# Patient Record
Sex: MALE | Race: WHITE | NOT HISPANIC OR LATINO
[De-identification: names, ages, dates, MRNs, and addresses within clinical notes are randomized per-mention and may not be internally consistent; named-entity substitution may affect disease eponyms.]

---

## 2017-02-08 ENCOUNTER — LAB SERVICES (OUTPATIENT)
Dept: OTHER | Age: 52
End: 2017-02-08

## 2017-02-08 ENCOUNTER — CHARTING TRANS (OUTPATIENT)
Dept: OTHER | Age: 52
End: 2017-02-08

## 2017-02-08 LAB
CURRENT DOSE: NORMAL
INR PPP: 2.1
RECOMMENDED DOSE: NORMAL

## 2017-09-26 ENCOUNTER — CHARTING TRANS (OUTPATIENT)
Dept: OTHER | Age: 52
End: 2017-09-26

## 2017-11-10 ENCOUNTER — MYAURORA ACCOUNT LINK (OUTPATIENT)
Dept: OTHER | Age: 52
End: 2017-11-10

## 2017-11-10 ENCOUNTER — CHARTING TRANS (OUTPATIENT)
Dept: OTHER | Age: 52
End: 2017-11-10

## 2018-01-30 PROBLEM — Z12.11 COLON CANCER SCREENING: Status: ACTIVE | Noted: 2018-01-30

## 2018-01-30 PROBLEM — F17.200 TOBACCO DEPENDENCE: Status: ACTIVE | Noted: 2018-01-30

## 2018-01-30 PROBLEM — F41.9 ANXIETY: Status: ACTIVE | Noted: 2018-01-30

## 2018-01-30 PROBLEM — M1A.00X0 IDIOPATHIC CHRONIC GOUT WITHOUT TOPHUS, UNSPECIFIED SITE: Status: ACTIVE | Noted: 2018-01-30

## 2018-01-30 PROBLEM — I89.0 LYMPHEDEMA OF BOTH LOWER EXTREMITIES: Status: ACTIVE | Noted: 2018-01-30

## 2018-07-25 PROBLEM — Z12.5 PROSTATE CANCER SCREENING: Status: ACTIVE | Noted: 2018-07-25

## 2018-07-25 PROBLEM — Z79.01 LONG TERM CURRENT USE OF ANTICOAGULANT: Status: ACTIVE | Noted: 2018-07-25

## 2018-11-02 VITALS
TEMPERATURE: 98.6 F | BODY MASS INDEX: 39.17 KG/M2 | RESPIRATION RATE: 18 BRPM | WEIGHT: 315 LBS | DIASTOLIC BLOOD PRESSURE: 100 MMHG | SYSTOLIC BLOOD PRESSURE: 150 MMHG | HEIGHT: 75 IN | HEART RATE: 86 BPM

## 2018-11-02 VITALS
TEMPERATURE: 98 F | HEART RATE: 62 BPM | SYSTOLIC BLOOD PRESSURE: 118 MMHG | HEIGHT: 75 IN | WEIGHT: 315 LBS | BODY MASS INDEX: 39.17 KG/M2 | DIASTOLIC BLOOD PRESSURE: 69 MMHG

## 2019-03-15 RX ORDER — METOPROLOL TARTRATE 50 MG/1
TABLET, FILM COATED ORAL
Qty: 180 TABLET | Refills: 0 | OUTPATIENT
Start: 2019-03-15

## 2019-05-21 PROBLEM — L98.9 SKIN DEFECT: Status: ACTIVE | Noted: 2019-05-21

## 2020-10-22 PROBLEM — M1A.00X0 IDIOPATHIC CHRONIC GOUT WITHOUT TOPHUS, UNSPECIFIED SITE: Status: RESOLVED | Noted: 2018-01-30 | Resolved: 2020-10-22

## 2020-10-22 PROBLEM — Z12.5 PROSTATE CANCER SCREENING: Status: RESOLVED | Noted: 2018-07-25 | Resolved: 2020-10-22

## 2020-10-22 PROBLEM — Z87.891 HISTORY OF TOBACCO USE: Status: ACTIVE | Noted: 2018-01-30

## 2020-10-22 PROBLEM — Z12.11 COLON CANCER SCREENING: Status: RESOLVED | Noted: 2018-01-30 | Resolved: 2020-10-22

## 2020-10-22 PROBLEM — L98.9 SKIN DEFECT: Status: RESOLVED | Noted: 2019-05-21 | Resolved: 2020-10-22

## 2021-02-17 ENCOUNTER — HOSPITAL ENCOUNTER (INPATIENT)
Facility: HOSPITAL | Age: 56
LOS: 1 days | Discharge: HOME | DRG: 607 | End: 2021-02-18
Attending: HOSPITALIST | Admitting: HOSPITALIST
Payer: COMMERCIAL

## 2021-02-17 PROBLEM — L03.90 CELLULITIS: Status: ACTIVE | Noted: 2021-02-17

## 2021-02-17 LAB
ANION GAP SERPL CALC-SCNC: 3 MMOL/L (ref 0–18)
BUN BLD-MCNC: 9 MG/DL (ref 7–18)
BUN/CREAT SERPL: 7.8 (ref 10–20)
CALCIUM BLD-MCNC: 8.7 MG/DL (ref 8.5–10.1)
CHLORIDE SERPL-SCNC: 107 MMOL/L (ref 98–112)
CO2 SERPL-SCNC: 32 MMOL/L (ref 21–32)
CREAT BLD-MCNC: 1.16 MG/DL
GLUCOSE BLD-MCNC: 106 MG/DL (ref 70–99)
OSMOLALITY SERPL CALC.SUM OF ELEC: 293 MOSM/KG (ref 275–295)
POTASSIUM SERPL-SCNC: 3.8 MMOL/L (ref 3.5–5.1)
SARS-COV-2 RNA RESP QL NAA+PROBE: NOT DETECTED
SODIUM SERPL-SCNC: 142 MMOL/L (ref 136–145)

## 2021-02-17 PROCEDURE — 80048 BASIC METABOLIC PNL TOTAL CA: CPT | Performed by: HOSPITALIST

## 2021-02-17 RX ORDER — ALPRAZOLAM 0.5 MG/1
0.5 TABLET ORAL 2 TIMES DAILY PRN
Status: DISCONTINUED | OUTPATIENT
Start: 2021-02-17 | End: 2021-02-19

## 2021-02-17 RX ORDER — LOSARTAN POTASSIUM 100 MG/1
100 TABLET ORAL NIGHTLY
Status: DISCONTINUED | OUTPATIENT
Start: 2021-02-18 | End: 2021-02-17

## 2021-02-17 RX ORDER — METOPROLOL TARTRATE 50 MG/1
50 TABLET, FILM COATED ORAL
Status: DISCONTINUED | OUTPATIENT
Start: 2021-02-18 | End: 2021-02-17

## 2021-02-17 RX ORDER — METOPROLOL TARTRATE 50 MG/1
50 TABLET, FILM COATED ORAL
Status: DISCONTINUED | OUTPATIENT
Start: 2021-02-17 | End: 2021-02-19

## 2021-02-17 RX ORDER — LOSARTAN POTASSIUM 100 MG/1
100 TABLET ORAL NIGHTLY
Status: DISCONTINUED | OUTPATIENT
Start: 2021-02-17 | End: 2021-02-19

## 2021-02-17 RX ORDER — DILTIAZEM HYDROCHLORIDE 120 MG/1
240 CAPSULE, EXTENDED RELEASE ORAL DAILY
Status: DISCONTINUED | OUTPATIENT
Start: 2021-02-18 | End: 2021-02-19

## 2021-02-17 RX ORDER — ACETAMINOPHEN 325 MG/1
650 TABLET ORAL EVERY 6 HOURS PRN
Status: DISCONTINUED | OUTPATIENT
Start: 2021-02-17 | End: 2021-02-19

## 2021-02-17 RX ORDER — HEPARIN SODIUM 5000 [USP'U]/ML
7500 INJECTION, SOLUTION INTRAVENOUS; SUBCUTANEOUS EVERY 8 HOURS SCHEDULED
Status: DISCONTINUED | OUTPATIENT
Start: 2021-02-17 | End: 2021-02-17

## 2021-02-18 VITALS
OXYGEN SATURATION: 93 % | DIASTOLIC BLOOD PRESSURE: 87 MMHG | TEMPERATURE: 98 F | WEIGHT: 315 LBS | HEART RATE: 90 BPM | SYSTOLIC BLOOD PRESSURE: 130 MMHG | RESPIRATION RATE: 18 BRPM | BODY MASS INDEX: 61 KG/M2

## 2021-02-18 LAB
BASOPHILS # BLD AUTO: 0.02 X10(3) UL (ref 0–0.2)
BASOPHILS NFR BLD AUTO: 0.3 %
DEPRECATED RDW RBC AUTO: 48.2 FL (ref 35.1–46.3)
EOSINOPHIL # BLD AUTO: 0.17 X10(3) UL (ref 0–0.7)
EOSINOPHIL NFR BLD AUTO: 2.8 %
ERYTHROCYTE [DISTWIDTH] IN BLOOD BY AUTOMATED COUNT: 13.9 % (ref 11–15)
HCT VFR BLD AUTO: 41.9 %
HGB BLD-MCNC: 14.2 G/DL
IMM GRANULOCYTES # BLD AUTO: 0.01 X10(3) UL (ref 0–1)
IMM GRANULOCYTES NFR BLD: 0.2 %
INR BLD: 1.14 (ref 0.89–1.11)
LYMPHOCYTES # BLD AUTO: 1.53 X10(3) UL (ref 1–4)
LYMPHOCYTES NFR BLD AUTO: 25 %
MCH RBC QN AUTO: 32 PG (ref 26–34)
MCHC RBC AUTO-ENTMCNC: 33.9 G/DL (ref 31–37)
MCV RBC AUTO: 94.4 FL
MONOCYTES # BLD AUTO: 0.66 X10(3) UL (ref 0.1–1)
MONOCYTES NFR BLD AUTO: 10.8 %
NEUTROPHILS # BLD AUTO: 3.72 X10 (3) UL (ref 1.5–7.7)
NEUTROPHILS # BLD AUTO: 3.72 X10(3) UL (ref 1.5–7.7)
NEUTROPHILS NFR BLD AUTO: 60.9 %
PLATELET # BLD AUTO: 181 10(3)UL (ref 150–450)
PSA SERPL DL<=0.01 NG/ML-MCNC: 15 SECONDS (ref 12.4–14.6)
RBC # BLD AUTO: 4.44 X10(6)UL
WBC # BLD AUTO: 6.1 X10(3) UL (ref 4–11)

## 2021-02-18 PROCEDURE — 85610 PROTHROMBIN TIME: CPT | Performed by: HOSPITALIST

## 2021-02-18 PROCEDURE — 99214 OFFICE O/P EST MOD 30 MIN: CPT

## 2021-02-18 PROCEDURE — 85025 COMPLETE CBC W/AUTO DIFF WBC: CPT | Performed by: HOSPITALIST

## 2021-02-18 RX ORDER — DIPHENHYDRAMINE HYDROCHLORIDE 50 MG/ML
INJECTION INTRAMUSCULAR; INTRAVENOUS
Status: COMPLETED
Start: 2021-02-18 | End: 2021-02-18

## 2021-02-18 RX ORDER — MORPHINE SULFATE 2 MG/ML
INJECTION, SOLUTION INTRAMUSCULAR; INTRAVENOUS
Status: COMPLETED
Start: 2021-02-18 | End: 2021-02-18

## 2021-02-18 RX ORDER — POTASSIUM CHLORIDE 20 MEQ/1
40 TABLET, EXTENDED RELEASE ORAL ONCE
Status: COMPLETED | OUTPATIENT
Start: 2021-02-18 | End: 2021-02-18

## 2021-02-18 RX ORDER — DIPHENHYDRAMINE HYDROCHLORIDE 50 MG/ML
25 INJECTION INTRAMUSCULAR; INTRAVENOUS ONCE
Status: COMPLETED | OUTPATIENT
Start: 2021-02-18 | End: 2021-02-18

## 2021-02-18 RX ORDER — HEPARIN SODIUM 5000 [USP'U]/ML
5000 INJECTION, SOLUTION INTRAVENOUS; SUBCUTANEOUS ONCE
Status: COMPLETED | OUTPATIENT
Start: 2021-02-18 | End: 2021-02-18

## 2021-02-18 RX ORDER — MORPHINE SULFATE 2 MG/ML
2 INJECTION, SOLUTION INTRAMUSCULAR; INTRAVENOUS ONCE
Status: COMPLETED | OUTPATIENT
Start: 2021-02-18 | End: 2021-02-18

## 2021-02-18 NOTE — H&P
[facility] Hospitalist History and Physical      CC: R LE foot wounds/pain    PCP: Tamanna Barrett MD    History of Present Illness: Patient is a 48year old male with PMH sig for obesity, JARETT, HTN, bipolar, anxiety, hx of PE, hx of LE lymphadema and prior infect Tartrate 50 MG Oral Tab, Take 1 tablet (50 mg total) by mouth 2 (two) times daily. , Disp: 180 tablet, Rfl: 1    •  dilTIAZem HCl ER Beads 240 MG Oral Capsule SR 24 Hr, Take 1 capsule (240 mg total) by mouth daily. , Disp: 90 capsule, Rfl: 1          Current 142 02/17/2021    K 3.8 02/17/2021     02/17/2021    CO2 32.0 02/17/2021     02/17/2021    CA 8.7 02/17/2021    INR 1.14 02/18/2021    PTP 15.0 02/18/2021       Above labs reviewed.     Radiology:    I independently reviewed the following studi

## 2021-02-18 NOTE — CONSULTS
[facility] Pharmacy Note: Antimicrobial Weight Based Dose Adjustment for: piperacillin/tazobactam (Delynn Innocent)    Desire Calero is a 48year old patient who has been prescribed piperacillin/tazobactam (ZOSYN) 3.375 gm every 8 hours.     Estimated Creatinine Clearance:

## 2021-02-18 NOTE — CONSULTS
[facility]  Report of Inpatient Wound Care Consultation     Елена Slaughter Patient Status:  Inpatient    1965 MRN [id number]   [facility] 3SW-A Attending Macey Clifton Day # 1 PCP Chanel Caputo MD     REASON --    CREATSERUM 0.62* 1.16  --    BUN 8.0 9  --    GLU 83 106*  --    CA  --  8.7  --    ALB 3.2*  --   --    TP 5.4*  --   --    INR  --   --  1.14*       ASSESSMENT/ RECOMMENDATIONS:  Received verbal order from  To Blank Garcia, PT, MPT for

## 2021-02-18 NOTE — CONSULTS
[facility] Pharmacy Note:  Anticoagulation Weight Dose Adjustment for heparin    Riley Aguilar is a 48year old patient who has been prescribed heparin 5000 units every 8 hours.       Estimated Creatinine Clearance: 158.4 mL/min (A) (based on SCr of 0.62 mg/dL (L

## 2021-02-18 NOTE — PROGRESS NOTES
[facility], [facility]    Full consult dictated. A/P:  1.   Non-infected blisters right foot  2. Lymphedema    - all blisters de-roofed bedside  - no cellulitis.   No antibiotics required  - silvadene cream, 4x4's, kerlix bid  - WBAT with

## 2021-02-18 NOTE — PROGRESS NOTES
New consult called to Dr Ирина Braun. He was notified by his office. He is currently in clinic and will be here later today.

## 2021-02-18 NOTE — PLAN OF CARE
Rec pt from immediate care A&O x 4. VSS. On RA. C/o only mild pain, declines pain medication. Pics taken of right foot for pt chart. Wounds/blisters cleansed with saline, adaptic applied, and wrapped in kerlix.  Pt up with stand by assist to bathroom, voidi

## 2021-02-18 NOTE — PLAN OF CARE
Pt A&O. On room air. Tele and  monitoring maintained as ordered. Tolerating regular diet with good appetite. Last Bm 2/17/21. Voiding w/o difficulty. Pain managed with current medications.  One time dose of morphine given this evening for when Dr Evans Novak

## 2021-02-18 NOTE — PAYOR COMM NOTE
--------------  ADMISSION REVIEW     Payor: AMADO MARQUIS  Subscriber #:  [id number]  Authorization Number: [id number]    Admit date: 2/17/21  Admit time: 2024         Jerome Herrmann is a 48year old male. with a chief complaint of Foot Pain (Patient c/o pain Piperacillin Sod-Tazobactam So (ZOSYN) 4.5 g in dextrose 5% 100 mL IVPB-ADDV     Date Action Dose Route User    2/18/2021 0906 New Bag 4.5 g Intravenous Maryjo Hermosillo RN    2/18/2021 0040 New Bag 4.5 g Intravenous Swathi Flores RN      Potassium C

## 2021-02-19 NOTE — CONSULTS
Azael Lacy  2/25/1965  [id number]      PODIATRY, FOOT AND SURGERY CONSULTATION NOTE    REASON FOR CONSULTATION: blisters right foot    CONSULTING PHYSICIAN: Dr. Moon Ace    HPI:  48year old male with PMH sig for obesity, JARETT, HTN, bipolar, anxiety, Scheduled Meds:  • apixaban  2.5 mg Oral BID   • piperacillin-tazobactam  4.5 g Intravenous Q8H   • dilTIAZem HCl ER Beads  240 mg Oral Daily   • melatonin  5 mg Oral Nightly   • Metoprolol Tartrate  50 mg Oral 2x Daily(Beta Blocker)   • losartan  10 Social History Narrative      Not on file      A comprehensive 10 point review of systems was completed. Pertinent positives and negatives noted in the the HPI.     O :    02/18/21  1549   BP: 130/87   Pulse: 90   Resp: 18   Temp: 97.6 °F (36.4 °C)       G Absolute 0.66 0.10 - 1.00 x10(3) uL    Eosinophil Absolute 0.17 0.00 - 0.70 x10(3) uL    Basophil Absolute 0.02 0.00 - 0.20 x10(3) uL    Immature Granulocyte Absolute 0.01 0.00 - 1.00 x10(3) uL    Neutrophil % 60.9 %    Lymphocyte % 25.0 %    Monocyte % 10

## 2022-02-02 PROBLEM — L03.90 CELLULITIS: Status: RESOLVED | Noted: 2021-02-17 | Resolved: 2022-02-02

## 2023-07-20 DIAGNOSIS — G47.33 OSA (OBSTRUCTIVE SLEEP APNEA): Primary | ICD-10-CM
